# Patient Record
Sex: MALE | NOT HISPANIC OR LATINO | Employment: STUDENT | URBAN - METROPOLITAN AREA
[De-identification: names, ages, dates, MRNs, and addresses within clinical notes are randomized per-mention and may not be internally consistent; named-entity substitution may affect disease eponyms.]

---

## 2021-11-05 DIAGNOSIS — M84.375A STRESS FRACTURE OF LEFT FOOT, INITIAL ENCOUNTER: Primary | ICD-10-CM

## 2021-11-19 ENCOUNTER — LAB (OUTPATIENT)
Dept: LAB | Facility: CLINIC | Age: 21
End: 2021-11-19
Payer: COMMERCIAL

## 2021-11-19 ENCOUNTER — ANCILLARY PROCEDURE (OUTPATIENT)
Dept: BONE DENSITY | Facility: CLINIC | Age: 21
End: 2021-11-19
Payer: COMMERCIAL

## 2021-11-19 DIAGNOSIS — M84.375A STRESS FRACTURE OF LEFT FOOT, INITIAL ENCOUNTER: ICD-10-CM

## 2021-11-19 LAB
ALBUMIN SERPL-MCNC: 4 G/DL (ref 3.4–5)
ALP SERPL-CCNC: 56 U/L (ref 40–150)
ALT SERPL W P-5'-P-CCNC: 18 U/L (ref 0–70)
ANION GAP SERPL CALCULATED.3IONS-SCNC: 8 MMOL/L (ref 3–14)
AST SERPL W P-5'-P-CCNC: 12 U/L (ref 0–45)
BASOPHILS # BLD AUTO: 0 10E3/UL (ref 0–0.2)
BASOPHILS NFR BLD AUTO: 0 %
BILIRUB SERPL-MCNC: 0.5 MG/DL (ref 0.2–1.3)
BUN SERPL-MCNC: 13 MG/DL (ref 7–30)
CALCIUM SERPL-MCNC: 9.3 MG/DL (ref 8.5–10.1)
CHLORIDE BLD-SCNC: 103 MMOL/L (ref 94–109)
CO2 SERPL-SCNC: 29 MMOL/L (ref 20–32)
CREAT SERPL-MCNC: 0.94 MG/DL (ref 0.66–1.25)
DEPRECATED CALCIDIOL+CALCIFEROL SERPL-MC: 24 UG/L (ref 20–75)
EOSINOPHIL # BLD AUTO: 0.1 10E3/UL (ref 0–0.7)
EOSINOPHIL NFR BLD AUTO: 2 %
ERYTHROCYTE [DISTWIDTH] IN BLOOD BY AUTOMATED COUNT: 11.9 % (ref 10–15)
GFR SERPL CREATININE-BSD FRML MDRD: >90 ML/MIN/1.73M2
GLUCOSE BLD-MCNC: 91 MG/DL (ref 70–99)
HCT VFR BLD AUTO: 44.8 % (ref 40–53)
HGB BLD-MCNC: 14.7 G/DL (ref 13.3–17.7)
IMM GRANULOCYTES # BLD: 0 10E3/UL
IMM GRANULOCYTES NFR BLD: 0 %
LYMPHOCYTES # BLD AUTO: 1.1 10E3/UL (ref 0.8–5.3)
LYMPHOCYTES NFR BLD AUTO: 14 %
MCH RBC QN AUTO: 29.3 PG (ref 26.5–33)
MCHC RBC AUTO-ENTMCNC: 32.8 G/DL (ref 31.5–36.5)
MCV RBC AUTO: 89 FL (ref 78–100)
MONOCYTES # BLD AUTO: 0.5 10E3/UL (ref 0–1.3)
MONOCYTES NFR BLD AUTO: 7 %
NEUTROPHILS # BLD AUTO: 5.9 10E3/UL (ref 1.6–8.3)
NEUTROPHILS NFR BLD AUTO: 77 %
NRBC # BLD AUTO: 0 10E3/UL
NRBC BLD AUTO-RTO: 0 /100
PLATELET # BLD AUTO: 232 10E3/UL (ref 150–450)
POTASSIUM BLD-SCNC: 4.1 MMOL/L (ref 3.4–5.3)
PROT SERPL-MCNC: 7.3 G/DL (ref 6.8–8.8)
PTH-INTACT SERPL-MCNC: 24 PG/ML (ref 18–80)
RBC # BLD AUTO: 5.01 10E6/UL (ref 4.4–5.9)
SHBG SERPL-SCNC: 22 NMOL/L (ref 11–80)
SODIUM SERPL-SCNC: 140 MMOL/L (ref 133–144)
T3 SERPL-MCNC: 75 NG/DL (ref 60–181)
T3FREE SERPL-MCNC: 3 PG/ML (ref 2.3–4.2)
TSH SERPL DL<=0.005 MIU/L-ACNC: 1.22 MU/L (ref 0.4–4)
WBC # BLD AUTO: 7.7 10E3/UL (ref 4–11)

## 2021-11-19 PROCEDURE — 84481 FREE ASSAY (FT-3): CPT | Performed by: PATHOLOGY

## 2021-11-19 PROCEDURE — 84480 ASSAY TRIIODOTHYRONINE (T3): CPT | Performed by: PATHOLOGY

## 2021-11-19 PROCEDURE — 84270 ASSAY OF SEX HORMONE GLOBUL: CPT | Performed by: PATHOLOGY

## 2021-11-19 PROCEDURE — 80050 GENERAL HEALTH PANEL: CPT | Performed by: PATHOLOGY

## 2021-11-19 PROCEDURE — 83970 ASSAY OF PARATHORMONE: CPT | Performed by: PATHOLOGY

## 2021-11-19 PROCEDURE — 82306 VITAMIN D 25 HYDROXY: CPT | Performed by: PATHOLOGY

## 2021-11-19 PROCEDURE — 84403 ASSAY OF TOTAL TESTOSTERONE: CPT | Performed by: PATHOLOGY

## 2021-11-19 PROCEDURE — 36415 COLL VENOUS BLD VENIPUNCTURE: CPT | Performed by: PATHOLOGY

## 2021-11-19 PROCEDURE — 77080 DXA BONE DENSITY AXIAL: CPT

## 2021-11-23 LAB
TESTOST FREE SERPL-MCNC: 11.15 NG/DL
TESTOST SERPL-MCNC: 438 NG/DL (ref 240–950)

## 2021-11-24 ENCOUNTER — VIRTUAL VISIT (OUTPATIENT)
Dept: ORTHOPEDICS | Facility: CLINIC | Age: 21
End: 2021-11-24
Payer: COMMERCIAL

## 2021-11-24 DIAGNOSIS — M84.375D STRESS FRACTURE OF LEFT FOOT WITH ROUTINE HEALING, SUBSEQUENT ENCOUNTER: Primary | ICD-10-CM

## 2021-11-24 DIAGNOSIS — E55.9 VITAMIN D DEFICIENCY: ICD-10-CM

## 2021-11-24 PROCEDURE — 99203 OFFICE O/P NEW LOW 30 MIN: CPT | Mod: 95 | Performed by: STUDENT IN AN ORGANIZED HEALTH CARE EDUCATION/TRAINING PROGRAM

## 2021-11-24 RX ORDER — ERGOCALCIFEROL 1.25 MG/1
50000 CAPSULE, LIQUID FILLED ORAL WEEKLY
Qty: 8 CAPSULE | Refills: 0 | Status: SHIPPED | OUTPATIENT
Start: 2021-11-24

## 2021-11-24 NOTE — LETTER
11/24/2021      RE: Wil Perla  [address]  [facility] [address]       Dominick is a 21 year old who is being evaluated via a billable video visit.      How would you like to obtain your AVS? MyChart  If the video visit is dropped, the invitation should be resent by: Text to cell phone: [phone]  Will anyone else be joining your video visit? No      Video Start Time: 2:30 PM  Video-Visit Details    Type of service:  Video Visit    Video End Time:3:05 PM    Originating Location (pt. Location): Home    Distant Location (provider location):  remote    Platform used for Video Visit: [facility]  Sports Medicine Clinic  Clinics and Surgery Center  VIRTUAL VISIT           SUBJECTIVE       Wil Perla is a 21 year old male without significant PMHx presenting for a virtual video visit for follow up of labs and DXA.     [place] and field [place] [employer].     Was throwing shot put and had a injury to his ankle which the continued to practice on then was found to have a anterior calcaneous stress fracture. There was no significant increase in his training prior to his injury.  He did have a bruised heel last season which was painful. He took a month and half off and it was completely gone.     Patient was seen in the [place] training room in late October, placed in a boot, and subsequent MRI showed the stress fracture. At that time, the patient was made non-weight bearing and has remained non-weight bearing. He states he is in no pain at this point.     Patient denies other stress injuries but does have a history of shin splints.      PMH, Medications and Allergies were reviewed and updated as needed.    ROS:  As noted above otherwise negative.    There is no problem list on file for this patient.      Current Outpatient Medications   Medication Sig Dispense Refill     vitamin D2 (ERGOCALCIFEROL) 59235 units (1250 mcg) capsule Take 1 capsule (50,000 Units) by mouth once a week 8 capsule 0             OBJECTIVE:       GENERAL: Healthy, alert and no distress  EYES: Eyes grossly normal to inspection.  No discharge or erythema, or obvious scleral/conjunctival abnormalities.  RESP: No audible wheeze, cough, or visible cyanosis.  No visible retractions or increased work of breathing.    SKIN: Visible skin clear. No significant rash, abnormal pigmentation or lesions.  NEURO: Cranial nerves grossly intact.  Mentation and speech appropriate for age.  PSYCH: Mentation appears normal, affect normal/bright, judgement and insight intact, normal speech and appearance well-groomed.          ASSESSMENT and PLAN:     Diagnoses and all orders for this visit:    Stress fracture of left foot with routine healing, subsequent encounter: Progressing as expected.  - Start vitamin D supplementation with 86829 international unit(s) weekly x8 weeks then transition to 1000 international unit(s) daily  - okay to transition to weight bearing in a boot as tolerated, if he has any degree of discomfort he should resume non-weight bearing  - plan for follow up MRI the last week of December, this will be arranged through [facility]  - continue cross training with swimming, okay to start biking and working on opposite leg strength while supervised  - turn in 24 hour urine calcium collection when able     Follow up will be completed via [facility] training room    Options for treatment and/or follow-up care were reviewed with the patient was actively involved in the decision making process. Patient verbalized understanding and was in agreement with the plan.    The patient was seen by and discussed with Dr.Suzanne GRAYSON Mccall MD, CAQ, CCD    Reginaldo Portillo, DO  Primary Care Sports Medicine Fellow      Video-Visit Details    Type of service:  Video Visit    Video Start Time: 1430    Video End Time (time video stopped): 1505    Total Visit time: 35 minutes      Attending Note:   I have talked with this patient along with  via video visit  and  watched the video examination with the fellow. I agree with the treatment plan as outlined. The plan was formulated with the fellow on the day of the patient's visit.   Caro Mccall MD, CAQ, CCD  [facility]  Sports Medicine and Bone Health        Caro Mccall MD

## 2021-11-24 NOTE — LETTER
Date:December 2, 2021      Patient was self referred, no letter generated. Do not send.        [facility] Health Information

## 2021-11-24 NOTE — LETTER
Date:December 2, 2021      Patient was self referred, no letter generated. Do not send.        [facility] Health Information

## 2021-11-24 NOTE — LETTER
11/24/2021       RE: Wil Perla  [address]  [facility] [address]     Dear Colleague,    Thank you for referring your patient, Wil Perla, to the [facility] SPORTS MEDICINE CL[facility]IC [place] [facility]. Please see a copy of my visit note below.    Dominick is a 21 year old who is being evaluated via a billable video visit.      How would you like to obtain your AVS? MyChart  If the video visit is dropped, the invitation should be resent by: Text to cell phone: [phone]  Will anyone else be joining your video visit? No      Video Start Time: 2:30 PM  Video-Visit Details    Type of service:  Video Visit    Video End Time:3:05 PM    Originating Location (pt. Location): Home    Distant Location (provider location):  remote    Platform used for Video Visit: [facility]  Sports Medicine Clinic  Clinics and Surgery Center  VIRTUAL VISIT           SUBJECTIVE       Wil Perla is a 21 year old male without significant PMHx presenting for a virtual video visit for follow up of labs and DXA.     [place] field in [employer].     Was throwing shot put and had a injury to his ankle which the continued to practice on then was found to have a anterior calcaneous stress fracture. There was no significant increase in his training prior to his injury.  He did have a bruised heel last season which was painful. He took a month and half off and it was completely gone.     Patient was seen in the [place] training room in late October, placed in a boot, and subsequent MRI showed the stress fracture. At that time, the patient was made non-weight bearing and has remained non-weight bearing. He states he is in no pain at this point.     Patient denies other stress injuries but does have a history of shin splints.      PMH, Medications and Allergies were reviewed and updated as needed.    ROS:  As noted above otherwise negative.    There is no  problem list on file for this patient.      Current Outpatient Medications   Medication Sig Dispense Refill     vitamin D2 (ERGOCALCIFEROL) 78298 units (1250 mcg) capsule Take 1 capsule (50,000 Units) by mouth once a week 8 capsule 0            OBJECTIVE:       GENERAL: Healthy, alert and no distress  EYES: Eyes grossly normal to inspection.  No discharge or erythema, or obvious scleral/conjunctival abnormalities.  RESP: No audible wheeze, cough, or visible cyanosis.  No visible retractions or increased work of breathing.    SKIN: Visible skin clear. No significant rash, abnormal pigmentation or lesions.  NEURO: Cranial nerves grossly intact.  Mentation and speech appropriate for age.  PSYCH: Mentation appears normal, affect normal/bright, judgement and insight intact, normal speech and appearance well-groomed.          ASSESSMENT and PLAN:     Diagnoses and all orders for this visit:    Stress fracture of left foot with routine healing, subsequent encounter: Progressing as expected.  - Start vitamin D supplementation with 03769 international unit(s) weekly x8 weeks then transition to 1000 international unit(s) daily  - okay to transition to weight bearing in a boot as tolerated, if he has any degree of discomfort he should resume non-weight bearing  - plan for follow up MRI the last week of December, this will be arranged through [facility]  - continue cross training with swimming, okay to start biking and working on opposite leg strength while supervised  - turn in 24 hour urine calcium collection when able     Follow up will be completed via [facility] training room    Options for treatment and/or follow-up care were reviewed with the patient was actively involved in the decision making process. Patient verbalized understanding and was in agreement with the plan.    The patient was seen by and discussed with Dr.Suzanne GRAYOSN Mccall MD, CAQ, CCD    Reginaldo Portillo DO  Primary Care Sports Medicine Fellow      Video-Visit  Details    Type of service:  Video Visit    Video Start Time: 1430    Video End Time (time video stopped): 1505    Total Visit time: 35 minutes      Attending Note:   I have talked with this patient along with  via video visit  and watched the video examination with the fellow. I agree with the treatment plan as outlined. The plan was formulated with the fellow on the day of the patient's visit.   Caro Mccall MD, CAQ, CCD  [facility]  Sports Medicine and Bone Health        Again, thank you for allowing me to participate in the care of your patient.      Sincerely,    Caro Mccall MD

## 2021-11-24 NOTE — PROGRESS NOTES
Dominick is a 21 year old who is being evaluated via a billable video visit.      How would you like to obtain your AVS? MyChart  If the video visit is dropped, the invitation should be resent by: Text to cell phone: [phone]  Will anyone else be joining your video visit? No      Video Start Time: 2:30 PM  Video-Visit Details    Type of service:  Video Visit    Video End Time:3:05 PM    Originating Location (pt. Location): Home    Distant Location (provider location):  remote    Platform used for Video Visit: [facility]  Sports Medicine Clinic  Clinics and Surgery Center  VIRTUAL VISIT           SUBJECTIVE       Wil Perla is a 21 year old male without significant PMHx presenting for a virtual video visit for follow up of labs and DXA.     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     Was throwing shot put and had a injury to his ankle which the continued to practice on then was found to have a anterior calcaneous stress fracture. There was no significant increase in his training prior to his injury.  He did have a bruised heel last season which was painful. He took a month and half off and it was completely gone.     Patient was seen in the [place] training room in late October, placed in a boot, and subsequent MRI showed the stress fracture. At that time, the patient was made non-weight bearing and has remained non-weight bearing. He states he is in no pain at this point.     Patient denies other stress injuries but does have a history of shin splints.      PMH, Medications and Allergies were reviewed and updated as needed.    ROS:  As noted above otherwise negative.    There is no problem list on file for this patient.      Current Outpatient Medications   Medication Sig Dispense Refill     vitamin D2 (ERGOCALCIFEROL) 31573 units (1250 mcg) capsule Take 1 capsule (50,000 Units) by mouth once a week 8 capsule 0            OBJECTIVE:       GENERAL: Healthy, alert and no distress  EYES: Eyes  grossly normal to inspection.  No discharge or erythema, or obvious scleral/conjunctival abnormalities.  RESP: No audible wheeze, cough, or visible cyanosis.  No visible retractions or increased work of breathing.    SKIN: Visible skin clear. No significant rash, abnormal pigmentation or lesions.  NEURO: Cranial nerves grossly intact.  Mentation and speech appropriate for age.  PSYCH: Mentation appears normal, affect normal/bright, judgement and insight intact, normal speech and appearance well-groomed.          ASSESSMENT and PLAN:     Diagnoses and all orders for this visit:    Stress fracture of left foot with routine healing, subsequent encounter: Progressing as expected.  - Start vitamin D supplementation with 24695 international unit(s) weekly x8 weeks then transition to 1000 international unit(s) daily  - okay to transition to weight bearing in a boot as tolerated, if he has any degree of discomfort he should resume non-weight bearing  - plan for follow up MRI the last week of December, this will be arranged through [facility]  - continue cross training with swimming, okay to start biking and working on opposite leg strength while supervised  - turn in 24 hour urine calcium collection when able     Follow up will be completed via [facility] training room    Options for treatment and/or follow-up care were reviewed with the patient was actively involved in the decision making process. Patient verbalized understanding and was in agreement with the plan.    The patient was seen by and discussed with Dr.Suzanne GRAYSON Mccall MD, CAQ, CCD    Reginaldo Portillo DO  Primary Care Sports Medicine Fellow      Video-Visit Details    Type of service:  Video Visit    Video Start Time: 1430    Video End Time (time video stopped): 1505    Total Visit time: 35 minutes

## 2021-12-02 NOTE — PROGRESS NOTES
Attending Note:   I have talked with this patient along with  via video visit  and watched the video examination with the fellow. I agree with the treatment plan as outlined. The plan was formulated with the fellow on the day of the patient's visit.   Caro Mccall MD, CAQ, CCD  [facility]  Sports Medicine and Bone Health

## 2021-12-12 ENCOUNTER — HEALTH MAINTENANCE LETTER (OUTPATIENT)
Age: 21
End: 2021-12-12

## 2022-10-03 ENCOUNTER — HEALTH MAINTENANCE LETTER (OUTPATIENT)
Age: 22
End: 2022-10-03

## 2023-02-11 ENCOUNTER — HEALTH MAINTENANCE LETTER (OUTPATIENT)
Age: 23
End: 2023-02-11

## 2024-03-09 ENCOUNTER — HEALTH MAINTENANCE LETTER (OUTPATIENT)
Age: 24
End: 2024-03-09

## 2025-05-21 DIAGNOSIS — R51.9 HEADACHE: Primary | ICD-10-CM

## 2025-05-22 ENCOUNTER — PATIENT OUTREACH (OUTPATIENT)
Dept: CARE COORDINATION | Facility: CLINIC | Age: 25
End: 2025-05-22
Payer: COMMERCIAL

## 2025-05-26 ENCOUNTER — PATIENT OUTREACH (OUTPATIENT)
Dept: CARE COORDINATION | Facility: CLINIC | Age: 25
End: 2025-05-26
Payer: COMMERCIAL